# Patient Record
Sex: FEMALE | Race: WHITE | NOT HISPANIC OR LATINO
[De-identification: names, ages, dates, MRNs, and addresses within clinical notes are randomized per-mention and may not be internally consistent; named-entity substitution may affect disease eponyms.]

---

## 2022-06-28 ENCOUNTER — DASHBOARD ENCOUNTERS (OUTPATIENT)
Age: 57
End: 2022-06-28

## 2022-06-28 ENCOUNTER — WEB ENCOUNTER (OUTPATIENT)
Dept: URBAN - METROPOLITAN AREA CLINIC 107 | Facility: CLINIC | Age: 57
End: 2022-06-28

## 2022-06-28 ENCOUNTER — LAB OUTSIDE AN ENCOUNTER (OUTPATIENT)
Dept: URBAN - METROPOLITAN AREA CLINIC 107 | Facility: CLINIC | Age: 57
End: 2022-06-28

## 2022-06-28 ENCOUNTER — OFFICE VISIT (OUTPATIENT)
Dept: URBAN - METROPOLITAN AREA CLINIC 107 | Facility: CLINIC | Age: 57
End: 2022-06-28
Payer: COMMERCIAL

## 2022-06-28 VITALS
TEMPERATURE: 97.2 F | HEIGHT: 64 IN | WEIGHT: 203 LBS | DIASTOLIC BLOOD PRESSURE: 74 MMHG | SYSTOLIC BLOOD PRESSURE: 150 MMHG | HEART RATE: 56 BPM | BODY MASS INDEX: 34.66 KG/M2

## 2022-06-28 DIAGNOSIS — R11.2 NAUSEA AND VOMITING, UNSPECIFIED VOMITING TYPE: ICD-10-CM

## 2022-06-28 DIAGNOSIS — Z12.11 COLON CANCER SCREENING: ICD-10-CM

## 2022-06-28 DIAGNOSIS — K21.9 GASTROESOPHAGEAL REFLUX DISEASE, UNSPECIFIED WHETHER ESOPHAGITIS PRESENT: ICD-10-CM

## 2022-06-28 DIAGNOSIS — R13.19 ESOPHAGEAL DYSPHAGIA: ICD-10-CM

## 2022-06-28 PROCEDURE — 99204 OFFICE O/P NEW MOD 45 MIN: CPT | Performed by: NURSE PRACTITIONER

## 2022-06-28 RX ORDER — MELOXICAM 15 MG/1
1 TABLET TABLET ORAL
Status: ACTIVE | COMMUNITY

## 2022-06-28 RX ORDER — PANTOPRAZOLE SODIUM 40 MG/1
1 TABLET TABLET, DELAYED RELEASE ORAL ONCE A DAY
Qty: 30 | Refills: 5 | OUTPATIENT
Start: 2022-06-28

## 2022-06-28 RX ORDER — PRAMIPEXOLE DIHYDROCHLORIDE 0.5 MG/1
3 TABLET TABLET ORAL QHS
Status: ACTIVE | COMMUNITY

## 2022-06-28 RX ORDER — IBUPROFEN 800 MG/1
1 TABLET TABLET, FILM COATED ORAL
Status: ACTIVE | COMMUNITY

## 2022-06-28 RX ORDER — FAMOTIDINE 40 MG/1
1 TABLET AT BEDTIME TABLET, FILM COATED ORAL ONCE A DAY
Qty: 30 | OUTPATIENT
Start: 2022-06-28

## 2022-06-28 RX ORDER — OLMESARTAN MEDOXOMIL 40 MG/1
1 TABLET TABLET, FILM COATED ORAL ONCE A DAY
Status: ACTIVE | COMMUNITY

## 2022-06-28 NOTE — HPI-TODAY'S VISIT:
This is a 56-year-old female with a history of hypertension, migraines, and GERD referred from Dr. Jj for GERD and bariatric surgery status. She underwent gastric sleeve surgery in 2017.  She lost 50 pounds after surgery.  She has gained 30 pounds over the last year.  She denies changes in her diet  but her activity has significantly decreased due to a torn labrum and bone spurs in her hip for which she has surgery scheduled on 7/1/2022. For several months, she has experienced significant acid reflux reporting daytime reflux and heartburn she has postprandial bloating throughout the upper abdomen and occasional postprandial abdominal upper abdominal cramps.  She admits occasional postprandial nausea and vomiting. She has tried taking Rolaids and Tums for her symptoms.  These have been ineffective.  She briefly took pantoprazole and, 2 months ago, her primary care physician prescribed metoclopramide 10 mg.  She has been taking 2 in the morning and reports minimal improvement.  She continues to experience heartburn, particularly later in the day.  She tried over-the-counter Prilosec and reports minimal improvement on that medication.  She purchased a foam wedge and reports it has been ineffective.  She takes meloxicam twice a week and rarely uses ibuprofen 800 mg. She admits infrequent episodes of dysphagia describing solid food lodging in the center of her chest relieved with drinking liquids.  Her bowel habits are stable.  She typically has 1-3 stools per day.  Her bowel movements are occasionally loose. She denies a family history of esophageal, gastric, or colon cancer.  She reports 2 prior colonoscopies.  The last one was within 5 years performed by Dr. Bud Esparza in [place], [place].  She states this was normal and a 10-year follow-up was recommended.  She has not had a prior EGD.

## 2022-07-02 PROBLEM — 305058001: Status: ACTIVE | Noted: 2022-07-02

## 2022-07-19 PROBLEM — 16932000: Status: ACTIVE | Noted: 2022-06-28

## 2022-07-19 PROBLEM — 235595009: Status: ACTIVE | Noted: 2022-06-28

## 2022-07-19 PROBLEM — 40890009: Status: ACTIVE | Noted: 2022-06-28

## 2022-08-03 ENCOUNTER — OFFICE VISIT (OUTPATIENT)
Dept: URBAN - METROPOLITAN AREA SURGERY CENTER 25 | Facility: SURGERY CENTER | Age: 57
End: 2022-08-03
Payer: COMMERCIAL

## 2022-08-03 DIAGNOSIS — K29.50 CHRONIC GASTRITIS: ICD-10-CM

## 2022-08-03 DIAGNOSIS — K20.80 OTHER ESOPHAGITIS WITHOUT BLEEDING: ICD-10-CM

## 2022-08-03 PROCEDURE — 43239 EGD BIOPSY SINGLE/MULTIPLE: CPT | Performed by: INTERNAL MEDICINE

## 2022-08-03 PROCEDURE — G8907 PT DOC NO EVENTS ON DISCHARG: HCPCS | Performed by: INTERNAL MEDICINE

## 2022-08-03 RX ORDER — PRAMIPEXOLE DIHYDROCHLORIDE 0.5 MG/1
3 TABLET TABLET ORAL QHS
Status: ACTIVE | COMMUNITY

## 2022-08-03 RX ORDER — MELOXICAM 15 MG/1
1 TABLET TABLET ORAL
Status: ACTIVE | COMMUNITY

## 2022-08-03 RX ORDER — FAMOTIDINE 40 MG/1
1 TABLET AT BEDTIME TABLET, FILM COATED ORAL ONCE A DAY
Qty: 30 | Status: ACTIVE | COMMUNITY
Start: 2022-06-28

## 2022-08-03 RX ORDER — OLMESARTAN MEDOXOMIL 40 MG/1
1 TABLET TABLET, FILM COATED ORAL ONCE A DAY
Status: ACTIVE | COMMUNITY

## 2022-08-03 RX ORDER — PANTOPRAZOLE SODIUM 40 MG/1
1 TABLET TABLET, DELAYED RELEASE ORAL ONCE A DAY
Qty: 30 | Refills: 5 | Status: ACTIVE | COMMUNITY
Start: 2022-06-28

## 2022-08-03 RX ORDER — IBUPROFEN 800 MG/1
1 TABLET TABLET, FILM COATED ORAL
Status: ACTIVE | COMMUNITY

## 2022-08-17 PROBLEM — 8493009 CHRONIC GASTRITIS: Status: ACTIVE | Noted: 2022-08-11

## 2022-09-09 ENCOUNTER — OFFICE VISIT (OUTPATIENT)
Dept: URBAN - METROPOLITAN AREA CLINIC 107 | Facility: CLINIC | Age: 57
End: 2022-09-09

## 2022-10-14 ENCOUNTER — TELEPHONE ENCOUNTER (OUTPATIENT)
Dept: URBAN - METROPOLITAN AREA CLINIC 113 | Facility: CLINIC | Age: 57
End: 2022-10-14

## 2022-10-14 RX ORDER — FAMOTIDINE 40 MG/1
1 TABLET AT BEDTIME TABLET, FILM COATED ORAL ONCE A DAY
Qty: 30
Start: 2022-06-28

## 2022-10-17 ENCOUNTER — ERX REFILL RESPONSE (OUTPATIENT)
Dept: URBAN - METROPOLITAN AREA CLINIC 72 | Facility: CLINIC | Age: 57
End: 2022-10-17

## 2022-10-17 RX ORDER — FAMOTIDINE 40 MG/1
1 TABLET AT BEDTIME TABLET, FILM COATED ORAL ONCE A DAY
Qty: 30 | OUTPATIENT

## 2022-10-17 RX ORDER — FAMOTIDINE 40 MG/1
TAKE 1 TABLET BY MOUTH ONCE DAILY AT BEDTIME FOR 30 DAYS TABLET, FILM COATED ORAL
Qty: 30 TABLET | Refills: 6 | OUTPATIENT